# Patient Record
Sex: FEMALE | Race: WHITE
[De-identification: names, ages, dates, MRNs, and addresses within clinical notes are randomized per-mention and may not be internally consistent; named-entity substitution may affect disease eponyms.]

---
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
## 2023-06-30 ENCOUNTER — HOSPITAL ENCOUNTER (INPATIENT)
Dept: HOSPITAL 26 - MLD | Age: 23
LOS: 1 days | Discharge: HOME | DRG: 566 | End: 2023-07-01
Attending: OBSTETRICS & GYNECOLOGY | Admitting: OBSTETRICS & GYNECOLOGY
Payer: COMMERCIAL

## 2023-06-30 VITALS
SYSTOLIC BLOOD PRESSURE: 118 MMHG | HEART RATE: 100 BPM | RESPIRATION RATE: 16 BRPM | TEMPERATURE: 97.9 F | DIASTOLIC BLOOD PRESSURE: 69 MMHG

## 2023-06-30 VITALS — WEIGHT: 126 LBS | HEIGHT: 59.5 IN | BODY MASS INDEX: 25.06 KG/M2

## 2023-06-30 DIAGNOSIS — Z3A.35: ICD-10-CM

## 2023-06-30 DIAGNOSIS — R10.2: ICD-10-CM

## 2023-06-30 DIAGNOSIS — Z20.822: ICD-10-CM

## 2023-06-30 DIAGNOSIS — O26.893: Primary | ICD-10-CM

## 2023-06-30 LAB
ALBUMIN FLD-MCNC: 2.8 G/DL (ref 3.4–5)
ANION GAP SERPL CALCULATED.3IONS-SCNC: 13.7 MMOL/L (ref 8–16)
APPEARANCE UR: CLEAR
AST SERPL-CCNC: 27 U/L (ref 15–37)
BASOPHILS # BLD AUTO: 0 K/UL (ref 0–0.22)
BASOPHILS NFR BLD AUTO: 0.2 % (ref 0–2)
BILIRUB SERPL-MCNC: 0.7 MG/DL (ref 0–1)
BILIRUB UR QL STRIP: NEGATIVE
BUN SERPL-MCNC: 6 MG/DL (ref 7–18)
CHLORIDE SERPL-SCNC: 103 MMOL/L (ref 98–107)
CO2 SERPL-SCNC: 25 MMOL/L (ref 21–32)
COLOR UR: YELLOW
CREAT SERPL-MCNC: 0.5 MG/DL (ref 0.6–1.3)
EOSINOPHIL # BLD AUTO: 0 K/UL (ref 0–0.4)
EOSINOPHIL NFR BLD AUTO: 0.2 % (ref 0–4)
ERYTHROCYTE [DISTWIDTH] IN BLOOD BY AUTOMATED COUNT: 13.9 % (ref 11.6–13.7)
GFR SERPL CREATININE-BSD FRML MDRD: 197 ML/MIN (ref 90–?)
GLUCOSE SERPL-MCNC: 67 MG/DL (ref 74–106)
GLUCOSE UR STRIP-MCNC: NEGATIVE MG/DL
HCT VFR BLD AUTO: 37.3 % (ref 36–48)
HGB BLD-MCNC: 12.7 G/DL (ref 12–16)
HGB UR QL STRIP: NEGATIVE
LEUKOCYTE ESTERASE UR QL STRIP: NEGATIVE
LYMPHOCYTES # BLD AUTO: 1.3 K/UL (ref 2.5–16.5)
LYMPHOCYTES NFR BLD AUTO: 12.3 % (ref 20.5–51.1)
MAGNESIUM SERPL-MCNC: 1.8 MG/DL (ref 1.8–2.4)
MCH RBC QN AUTO: 33 PG (ref 27–31)
MCHC RBC AUTO-ENTMCNC: 34 G/DL (ref 33–37)
MCV RBC AUTO: 95.3 FL (ref 80–94)
MONOCYTES # BLD AUTO: 0.4 K/UL (ref 0.8–1)
MONOCYTES NFR BLD AUTO: 4.1 % (ref 1.7–9.3)
NEUTROPHILS # BLD AUTO: 8.6 K/UL (ref 1.8–7.7)
NEUTROPHILS NFR BLD AUTO: 83.2 % (ref 42.2–75.2)
NITRITE UR QL STRIP: NEGATIVE
PH UR STRIP: 8 [PH] (ref 5–9)
PLATELET # BLD AUTO: 310 K/UL (ref 140–450)
POTASSIUM SERPL-SCNC: 3.7 MMOL/L (ref 3.5–5.1)
RBC # BLD AUTO: 3.91 MIL/UL (ref 4.2–5.4)
SODIUM SERPL-SCNC: 138 MMOL/L (ref 136–145)
WBC # BLD AUTO: 10.4 K/UL (ref 4.8–10.8)

## 2023-06-30 RX ADMIN — SODIUM CHLORIDE, SODIUM LACTATE, POTASSIUM CHLORIDE, AND CALCIUM CHLORIDE SCH MLS/HR: .6; .31; .03; .02 INJECTION, SOLUTION INTRAVENOUS at 21:40

## 2023-06-30 RX ADMIN — MAGNESIUM SULFATE IN WATER PRN MLS/HR: 40 INJECTION, SOLUTION INTRAVENOUS at 17:03

## 2023-06-30 RX ADMIN — SODIUM CHLORIDE, SODIUM LACTATE, POTASSIUM CHLORIDE, AND CALCIUM CHLORIDE SCH MLS/HR: .6; .31; .03; .02 INJECTION, SOLUTION INTRAVENOUS at 15:44

## 2023-06-30 NOTE — NUR
PATIENT HAS BEEN SCREENED AND CATEGORIZED AS LOW NUTRITION RISK. PATIENT WILL BE SEEN WITHIN 
7 DAYS OF ADMISSION.



7/7/23



[address]

## 2023-07-01 RX ADMIN — NIFEDIPINE SCH MG: 10 CAPSULE, LIQUID FILLED ORAL at 06:02

## 2023-07-01 RX ADMIN — MAGNESIUM SULFATE IN WATER PRN MLS/HR: 40 INJECTION, SOLUTION INTRAVENOUS at 03:29

## 2023-07-01 RX ADMIN — NIFEDIPINE SCH MG: 10 CAPSULE, LIQUID FILLED ORAL at 00:43

## 2023-07-01 RX ADMIN — NIFEDIPINE SCH MG: 10 CAPSULE, LIQUID FILLED ORAL at 12:07

## 2023-07-01 RX ADMIN — SODIUM CHLORIDE, SODIUM LACTATE, POTASSIUM CHLORIDE, AND CALCIUM CHLORIDE SCH MLS/HR: .6; .31; .03; .02 INJECTION, SOLUTION INTRAVENOUS at 06:26
